# Patient Record
Sex: MALE | Race: WHITE
[De-identification: names, ages, dates, MRNs, and addresses within clinical notes are randomized per-mention and may not be internally consistent; named-entity substitution may affect disease eponyms.]

---

## 2018-12-16 ENCOUNTER — HOSPITAL ENCOUNTER (EMERGENCY)
Dept: HOSPITAL 80 - FED | Age: 48
Discharge: HOME | End: 2018-12-16
Payer: COMMERCIAL

## 2018-12-16 VITALS — DIASTOLIC BLOOD PRESSURE: 86 MMHG | SYSTOLIC BLOOD PRESSURE: 120 MMHG
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
## 2018-12-16 DIAGNOSIS — S92.355A: Primary | ICD-10-CM
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
## 2018-12-16 PROCEDURE — 2W3RX1Z IMMOBILIZATION OF LEFT LOWER LEG USING SPLINT: ICD-10-PCS

## 2018-12-16 NOTE — EDPHY
H & P


Smoking Status: Never smoked


Time Seen by Provider: 12/16/18 10:15


HPI/ROS: 





CLINICAL IMPRESSION: 


 Closed, left, proximal diaphyseal fracture of the 5th metatarsal





ASSESSMENT/PLAN:


48-year-old male presents emergency department with acute left foot pain after 

tripping in the street last night.  No open wounds, distal neurovascular exam 

intact, no ankle or distal lower leg pain.  No prior orthopedic surgery to this 

leg.  X-rays read and interpreted by myself as a closed proximal 5th metatarsal 

fracture.  This may extend into the articular surface.  Final radiology read 

pending.  Patient was placed in a posterior short-leg splint and provided 

crutches.  Instructed to remain nonambulatory until follow-up with Orthopedics.

  Pain medication prescribed, rice treatment discussed, warning signs return to 

ED sooner alignment discharge.





DIFFERENTIAL DX:


Differential diagnosis includes but not limited to acute fracture, Lisfranc 

injury, ligamentous tear 





ED PROCEDURES:


Procedure:  Splint placement.





A left posterior short-leg splint and crutches  were applied by ED tech, 

supervised by myself.  After application of the splint I returned and re-

examined the patient.  The splint was adequately immobilizing the joint and 

distal to the splint the patient's circulation and sensation was intact.





ED COURSE:


10:30 a.m.:.  Preliminary review of x-rays reveals a closed proximal 5th 

metatarsal fracture that may extend into the articular surface.  Radiology 

images discussed and reviewed with the patient.  Final radiology read pending.  

Patient will placed in a splint and instructed to remain nonweightbearing until 

orthopedic follow-up.





CHIEF COMPLAINT: Left foot pain 





HPI:


48-year-old male presents to the emergency department with acute left foot pain 

after running across the street yesterday and tripping on his foot.  Patient 

has been able to bear weight on the leg although now has pain.  He reports no 

pain into the ankle lower leg or knee.  No loss of sensation to the toes.  No 

open wounds.  No prior orthopedic surgery to this joint.  He has been icing, 

elevating and using leftover Percocet that he had from a surgery earlier this 

year.





PAST MEDICAL HISTORY:  No prior orthopedic surgery





Pertinent Past Surgical History: Prior hemorrhoid surgery 





Social History:  Nonsmoker otherwise healthy





REVIEW OF SYSTEMS:


All other systems negative


Constitutional:  No fever, no chills


Musculoskeletal:  No deformity, + joint pain


Skin:  No rashes, color change or open wounds.


Neurological:  No sensory loss or weakness.





PHYSICAL EXAM:


General Appearance:  Alert, oriented, appropriate for age, cooperative, NAD, 

well hydrated, non-toxic appearing, VSS, no hypoxia.


Neurological:  Alert and oriented x 3, normal sensation and strength of 

extremities


Skin: Warm, dry, no rashes, no nodules on palpation.


Musculoskeletal:   Swelling and contusion noted to the lateral aspect of the 

left foot, primarily on the dorsum of the foot but also extending to the 

ventral surface.  No open wounds.  Reproducible tenderness to palpation of the 

proximal 5th metatarsal.  No reproducible ankle pain.  Full range of motion of 

the ankle.  Distal neurovascular exam intact.





MEDICAL DECISION MAKING:


Patient was seen independently.Secondary supervising physician at time of 

evaluation was Dr. Modi.


Diagnosis: Closed, left, proximal diaphyseal fracture of the 5th metatarsal . 

New, requires workup


Summary: See assessment and plan for summary of ED visit 


Independent visualization of images, tracing, or specimens yes.





Patient Progress stable.  (Reese King)


Constitutional: 





 Initial Vital Signs











Temperature (C)  36.7 C   12/16/18 10:05


 


Heart Rate  68   12/16/18 10:05


 


Respiratory Rate  18   12/16/18 10:05


 


Blood Pressure  120/86 H  12/16/18 10:05


 


O2 Sat (%)  97   12/16/18 10:05








 











O2 Delivery Mode               Room Air














Allergies/Adverse Reactions: 


 





No Known Allergies Allergy (Unverified 12/16/18 10:05)


 








Home Medications: 














 Medication  Instructions  Recorded


 


Ibuprofen  12/16/18


 


oxyCODONE/APAP 5/325 [Percocet 1 - 2 tab PO Q4-6PRN PRN #10 tab 12/16/18





5/325]  














MDM/Departure





- [facility]


Imaging: I viewed and interpreted images myself





- [facility]


ED Course/Re-evaluation: 





PHYSICIAN DOCUMENTATION:


The patient was evaluated and managed by the Physician Assistant.  My co-

signature indicates that I have reviewed this chart and I agree with the 

findings and plan of care as documented.  I am the secondary supervising 

physician. (Marcello Modi)





- Depart


Disposition: Home, Routine, Self-Care


Clinical Impression: 


Fracture of fifth metatarsal bone of left foot


Qualifiers:


 Encounter type: initial encounter Fracture type: closed Fracture alignment: 

nondisplaced Qualified Code(s): S92.355A - Nondisplaced fracture of fifth 

metatarsal bone, left foot, initial encounter for closed fracture





Condition: Good


Instructions:  Foot Fracture in Adults (ED)


Additional Instructions: 


DISCHARGE INSTRUCTIONS FROM YOUR DOCTOR 


Thank you for visiting our emergency department today. Please keep in mind that 

discharge from the emergency department does not mean that there is nothing 

wrong - it simply means that we have not identified an emergency condition that 

requires further evaluation or treatment in the hospital. You should always 

plan to follow up with primary care for re-evaluation of your condition in the 

next 2-3 days. If you have been referred to a specialist, please call as soon 

as possible (today or tomorrow) to schedule your follow up appointment at the 

appropriate time. 





X-rays of your foot reveal a closed 5th metatarsal fracture.  This will need 

orthopedic follow-up.  We placed to in a splint and gave crutches and would 

recommend not bearing weight on the foot until further advised by Orthopedics.  

Please contact their office tomorrow for a follow-up appointment.  Use pain 

medication only if needed Do not drive or drink alcohol while taking narcotic 

pain medication. Please be aware, narcotics can cause constipation, lethargy, 

and increase your risk of falling. Do not take Tylenol at the same time as 

Vicodin or Percocet.


Rest and elevate the affected extremity as much as possible. Ice the affected 

areas 20 min on, 20 min off for the next several days. 


Return to the emergency department for severe pain, loss of sensation to the 

foot or toes, fevers greater than 100.4, or any other concerns. 





People present with illnesses and injuries in different ways, and it is always 

possible that we have missed something. You may always return for re-evaluation 

if symptoms worsen or if they are not improving or if you develop new/different 

symptoms. 


Again, thank you for choosing our emergency department. We hope that you feel 

better.


Prescriptions: 


oxyCODONE/APAP 5/325 [Percocet 5/325] 1 - 2 tab PO Q4-6PRN PRN #10 tab


 PRN Reason: Pain, Breakthrough


Referrals: 


Georgette Villareal MD [Primary Care Provider] - As per Instructions


Fausto Guevara MD [Medical Doctor] - 1-2 days without fail